# Patient Record
Sex: MALE | Race: ASIAN | NOT HISPANIC OR LATINO | Employment: FULL TIME | URBAN - METROPOLITAN AREA
[De-identification: names, ages, dates, MRNs, and addresses within clinical notes are randomized per-mention and may not be internally consistent; named-entity substitution may affect disease eponyms.]

---

## 2024-01-31 ENCOUNTER — OFFICE VISIT (OUTPATIENT)
Dept: URGENT CARE | Facility: URGENT CARE | Age: 39
End: 2024-01-31
Payer: COMMERCIAL

## 2024-01-31 VITALS
HEART RATE: 132 BPM | WEIGHT: 201.8 LBS | RESPIRATION RATE: 16 BRPM | DIASTOLIC BLOOD PRESSURE: 83 MMHG | TEMPERATURE: 98.4 F | OXYGEN SATURATION: 100 % | SYSTOLIC BLOOD PRESSURE: 134 MMHG

## 2024-01-31 DIAGNOSIS — K92.1 MELENA: ICD-10-CM

## 2024-01-31 DIAGNOSIS — R00.0 TACHYCARDIA: Primary | ICD-10-CM

## 2024-01-31 PROCEDURE — 99204 OFFICE O/P NEW MOD 45 MIN: CPT | Performed by: PHYSICIAN ASSISTANT

## 2024-01-31 ASSESSMENT — PAIN SCALES - GENERAL: PAINLEVEL: MILD PAIN (3)

## 2024-01-31 NOTE — PROGRESS NOTES
Chief Complaint   Patient presents with    Black Stool     Sx onset- Monday morning. Discomfort in abdomin.        ASSESSMENT/PLAN:  Omar was seen today for black stool.    Diagnoses and all orders for this visit:    Tachycardia    Melena    Tachycardia that is sustained between 132 and 138 here in the clinic.  Not hypotensive.  Nonacute abdomen.  Is a heavy drinker with melena and suspect an upper GI bleed but the differential diagnosis is broad and also includes esophageal varices among others    Advised patient go to the emergency room for further evaluation management.  He agrees to go.      Hitesh Collins PA-C      SUBJECTIVE:  Omar is a 38 year old male who presents to urgent care with 2 days of black stool, feeling weak and lightheaded and some abdominal discomfort.  Did vomit yesterday but has been able to keep some fluids down.  There was not blood in the vomit.  Is a heavy drinker.  Does not smoke cigarettes.  Does not have any known chronic medical conditions.    ROS: Pertinent ROS neg other than the symptoms noted above in the HPI.     OBJECTIVE:  /83 (BP Location: Left arm, Patient Position: Sitting, Cuff Size: Adult Regular)   Pulse (!) 138   Temp 98.4  F (36.9  C) (Tympanic)   Resp 16   Wt 91.5 kg (201 lb 12.8 oz)   SpO2 100%    GENERAL: alert and no distress  EYES: Eyes grossly normal to inspection, PERRL and conjunctivae and sclerae normal, no jaundice  ABDOMEN: soft, nontender, no masses and bowel sounds normal    DIAGNOSTICS    No results found for any visits on 01/31/24.     No current outpatient medications on file.     No current facility-administered medications for this visit.      There is no problem list on file for this patient.     No past medical history on file.  No past surgical history on file.  No family history on file.  Social History     Tobacco Use    Smoking status: Not on file    Smokeless tobacco: Not on file   Substance Use Topics    Alcohol use: Not on file               The plan of care was discussed with the patient. They understand and agree with the course of treatment prescribed. A printed summary was given including instructions and medications.  The use of [employer] dictation services may have been used to construct the content in this note; any grammatical or spelling errors are non-intentional. Please contact the author of this note directly if you are in need of any clarification.